# Patient Record
Sex: MALE
[De-identification: names, ages, dates, MRNs, and addresses within clinical notes are randomized per-mention and may not be internally consistent; named-entity substitution may affect disease eponyms.]

---

## 2020-08-11 ENCOUNTER — DASHBOARD ENCOUNTERS (OUTPATIENT)
Age: 85
End: 2020-08-11

## 2020-08-11 ENCOUNTER — OFFICE VISIT (OUTPATIENT)
Dept: URBAN - METROPOLITAN AREA CLINIC 12 | Facility: CLINIC | Age: 85
End: 2020-08-11
Payer: COMMERCIAL

## 2020-08-11 VITALS
SYSTOLIC BLOOD PRESSURE: 129 MMHG | TEMPERATURE: 97.8 F | WEIGHT: 188 LBS | HEIGHT: 69 IN | DIASTOLIC BLOOD PRESSURE: 76 MMHG | BODY MASS INDEX: 27.85 KG/M2 | HEART RATE: 64 BPM

## 2020-08-11 DIAGNOSIS — R19.7 DIARRHEA: ICD-10-CM

## 2020-08-11 PROCEDURE — G8427 DOCREV CUR MEDS BY ELIG CLIN: HCPCS | Performed by: INTERNAL MEDICINE

## 2020-08-11 PROCEDURE — G9903 PT SCRN TBCO ID AS NON USER: HCPCS | Performed by: INTERNAL MEDICINE

## 2020-08-11 PROCEDURE — G8417 CALC BMI ABV UP PARAM F/U: HCPCS | Performed by: INTERNAL MEDICINE

## 2020-08-11 PROCEDURE — 99204 OFFICE O/P NEW MOD 45 MIN: CPT | Performed by: INTERNAL MEDICINE

## 2020-08-11 RX ORDER — MELATONIN 1 MG/ML
2 CAPSULE LIQUID (ML) ORAL ONCE A DAY
Status: ACTIVE | COMMUNITY

## 2020-08-11 RX ORDER — ATORVASTATIN CALCIUM 20 MG/1
1 TABLET TABLET, FILM COATED ORAL ONCE A DAY
Status: ACTIVE | COMMUNITY

## 2020-08-11 RX ORDER — ZOLPIDEM TARTRATE 10 MG/1
1 TABLET AT BEDTIME AS NEEDED TABLET, FILM COATED ORAL ONCE A DAY
Status: ACTIVE | COMMUNITY

## 2020-08-11 RX ORDER — LISINOPRIL 10 MG/1
1 TABLET TABLET ORAL ONCE A DAY
Status: ACTIVE | COMMUNITY

## 2020-08-11 RX ORDER — ERGOCALCIFEROL (VITAMIN D2) 50 MCG
1 CAPSULE CAPSULE ORAL ONCE A DAY
Status: ACTIVE | COMMUNITY

## 2020-08-11 NOTE — HPI-OTHER HISTORIES
84M  c/o diarrhea for 2 months.  stool studies at [facility] --reviewed. neg for c diff, O and P, salmonella, shigella, e coli diarrhea has resolved since last week no blood in stool no new meds no abd pain/N/V/fever no abd surgeries  before the diarrhea started he used to have 2 formed BM/day last colon in 4/2020--at [facility]--normal per pt

## 2020-09-11 ENCOUNTER — TELEPHONE ENCOUNTER (OUTPATIENT)
Dept: URBAN - METROPOLITAN AREA CLINIC 12 | Facility: CLINIC | Age: 85
End: 2020-09-11

## 2020-09-14 ENCOUNTER — TELEPHONE ENCOUNTER (OUTPATIENT)
Dept: URBAN - METROPOLITAN AREA CLINIC 92 | Facility: CLINIC | Age: 85
End: 2020-09-14

## 2020-09-17 LAB
ADENOVIRUS F 40/41: (no result)
ASTROVIRUS: (no result)
CAMPYLOBACTER: (no result)
CLOSTRIDIUM DIFFICILE PCR: (no result)
CRYPTOSPORIDIUM: (no result)
CYCLOSPORA: (no result)
E COLI O157: (no result)
ENTAMOEBA HISTOLYTICA: (no result)
ENTEROAGGREGATIVE E COLI: (no result)
ENTEROPATHOGENIC E COLI: (no result)
ENTEROTOXIGENIC E COLI: (no result)
GIARDIA LAMBLIA: (no result)
GIPCR PREVIOUS TEST DATE: (no result)
GIPCR SPECIMEN CONSISTENCY: (no result)
NAP1: (no result)
NOROVIRUS GI/GII: (no result)
PERFORMING LAB: (no result)
PERFORMING LAB: (no result)
PLESIOMONAS SHIGELLOIDES: (no result)
ROTAVIRUS A: (no result)
SALMONELLA: (no result)
SAPOVIRUS: (no result)
SHIGA LIKE TOXIN: (no result)
SHIGELLA/ENTEROINVASIVE E COLI: (no result)
VIBRIO CHOLERA: (no result)
VIBRIO: (no result)
YERSINIA ENTEROLITICA: (no result)